# Patient Record
Sex: FEMALE | URBAN - METROPOLITAN AREA
[De-identification: names, ages, dates, MRNs, and addresses within clinical notes are randomized per-mention and may not be internally consistent; named-entity substitution may affect disease eponyms.]

---

## 2024-09-18 ENCOUNTER — APPOINTMENT (RX ONLY)
Dept: URBAN - METROPOLITAN AREA CLINIC 110 | Facility: CLINIC | Age: 24
Setting detail: DERMATOLOGY
End: 2024-09-18

## 2024-09-18 VITALS — WEIGHT: 140 LBS | HEIGHT: 66 IN

## 2024-09-18 DIAGNOSIS — L91.0 HYPERTROPHIC SCAR: ICD-10-CM

## 2024-09-18 PROCEDURE — ? COUNSELING

## 2024-09-18 PROCEDURE — 99203 OFFICE O/P NEW LOW 30 MIN: CPT

## 2024-09-18 PROCEDURE — ? DEFER

## 2024-09-18 PROCEDURE — ? PRESCRIPTION

## 2024-09-18 RX ORDER — IMIQUIMOD 12.5 MG/.25G
CREAM TOPICAL
Qty: 12 | Refills: 0 | Status: ERX | COMMUNITY
Start: 2024-09-18

## 2024-09-18 RX ADMIN — IMIQUIMOD: 12.5 CREAM TOPICAL at 00:00

## 2024-09-18 ASSESSMENT — LOCATION DETAILED DESCRIPTION DERM
LOCATION DETAILED: LEFT SUPERIOR POSTERIOR HELIX
LOCATION DETAILED: RIGHT POSTERIOR EARLOBE
LOCATION DETAILED: LEFT SCAPHA

## 2024-09-18 ASSESSMENT — LOCATION SIMPLE DESCRIPTION DERM
LOCATION SIMPLE: RIGHT EAR
LOCATION SIMPLE: LEFT EAR

## 2024-09-18 ASSESSMENT — LOCATION ZONE DERM: LOCATION ZONE: EAR

## 2024-09-18 NOTE — PROCEDURE: DEFER
Size Of Lesion In Cm (Optional): 0
Detail Level: Detailed
Introduction Text (Please End With A Colon): The following procedure was deferred:
Instructions (Optional): Will send a referral for Dr. Aguilar

## 2024-09-18 NOTE — HPI: SCAR (KELOIDS)
Is This A New Presentation, Or A Follow-Up?: Scars
Additional History: Pt states that she started rogaine develop keloid after she pierced her ears

## 2024-10-01 ENCOUNTER — APPOINTMENT (RX ONLY)
Dept: URBAN - METROPOLITAN AREA CLINIC 111 | Facility: CLINIC | Age: 24
Setting detail: DERMATOLOGY
End: 2024-10-01

## 2024-10-01 DIAGNOSIS — L91.0 HYPERTROPHIC SCAR: ICD-10-CM

## 2024-10-01 PROBLEM — D48.5 NEOPLASM OF UNCERTAIN BEHAVIOR OF SKIN: Status: ACTIVE | Noted: 2024-10-01

## 2024-10-01 PROCEDURE — ? CPT CODE GENERATOR

## 2024-10-01 PROCEDURE — 11900 INJECT SKIN LESIONS </W 7: CPT | Mod: 59

## 2024-10-01 PROCEDURE — ? DEFER

## 2024-10-01 PROCEDURE — 11311 SHAVE SKIN LESION 0.6-1.0 CM: CPT

## 2024-10-01 PROCEDURE — 11313 SHAVE SKIN LESION >2.0 CM: CPT

## 2024-10-01 PROCEDURE — ? INTRALESIONAL KENALOG

## 2024-10-01 PROCEDURE — ? SHAVE REMOVAL

## 2024-10-01 PROCEDURE — ? COUNSELING

## 2024-10-01 ASSESSMENT — LOCATION DETAILED DESCRIPTION DERM
LOCATION DETAILED: LEFT SUPERIOR POSTERIOR HELIX
LOCATION DETAILED: LEFT POSTERIOR EAR
LOCATION DETAILED: LEFT SUPERIOR CRUS OF ANTIHELIX
LOCATION DETAILED: RIGHT POSTERIOR EARLOBE

## 2024-10-01 ASSESSMENT — LOCATION SIMPLE DESCRIPTION DERM
LOCATION SIMPLE: LEFT EAR
LOCATION SIMPLE: RIGHT EAR

## 2024-10-01 ASSESSMENT — LOCATION ZONE DERM: LOCATION ZONE: EAR

## 2024-10-01 NOTE — PROCEDURE: SHAVE REMOVAL
Medical Necessity Information: It is in your best interest to select a reason for this procedure from the list below. All of these items fulfill various CMS LCD requirements except the new and changing color options.
Medical Necessity Clause: This procedure was medically necessary because the lesion that was treated was:
Lab: [id number]
Lab Facility: [id number]
Detail Level: Detailed
Was A Bandage Applied: Yes
Size Of Lesion In Cm (Required): 1
X Size Of Lesion In Cm (Optional): 0
Depth Of Shave: dermis
Biopsy Method: Dermablade
Anesthesia Type: 1% lidocaine with epinephrine
Hemostasis: Drysol
Wound Care: Petrolatum
Render Path Notes In Note?: No
Consent was obtained from the patient. The risks and benefits to therapy were discussed in detail. Specifically, the risks of infection, scarring, bleeding, prolonged wound healing, incomplete removal and recurrence were addressed. Prior to the procedure, the treatment site was clearly identified and confirmed by the patient. All components of Universal Protocol/PAUSE Rule completed.
Post-Care Instructions: I reviewed with the patient in detail post-care instructions. Patient is to keep the biopsy site dry overnight, and then apply bacitracin twice daily until healed. 
Notification Instructions: Patient will be notified of pathology results. However, patient instructed to call the office if not contacted within 2 weeks.
Anticipated Plan (Based On Presumed Biopsy Results): Additionally; 5-0 Fast gut was used to suture some of the remaining skin in place. 
Billing Type: Third-Party Bill
Size Of Lesion In Cm (Required): 2.5
Consent was obtained from the patient. The risks and benefits to therapy were discussed in detail. Specifically, the risks of infection, scarring, bleeding, prolonged wound healing, incomplete removal, allergy to anesthesia, nerve injury and recurrence were addressed. Prior to the procedure, the treatment site was clearly identified and confirmed by the patient. All components of Universal Protocol/PAUSE Rule completed.
Post-Care Instructions: I reviewed with the patient in detail post-care instructions. Patient is to keep the biopsy site dry overnight, and then apply bacitracin twice daily until healed. Patient may apply hydrogen peroxide soaks to remove any crusting.

## 2024-10-01 NOTE — PROCEDURE: COUNSELING
Detail Level: Detailed
Patient Specific Counseling (Will Not Stick From Patient To Patient): I discussed with patient in detail that if I am to remove a keloid, ILK must be injected at the time of excision and monthly thereafter for 6 months. Patient reports understanding of this and agrees to come monthly for the next 6 months.

## 2024-10-01 NOTE — PROCEDURE: DEFER
Procedure To Be Performed At Next Visit: Shave Removal
Detail Level: Detailed
Introduction Text (Please End With A Colon): The following procedure was deferred:
Size Of Lesion In Cm (Optional): 0
Other Procedure: ILK Injection

## 2024-10-01 NOTE — PROCEDURE: INTRALESIONAL KENALOG
How Many Mls Were Removed From The 10 Mg/Ml (5ml) Vial When Preparing The Injectable Solution?: 0
Consent: The risks of atrophy were reviewed with the patient.
Show Inventory Tab: Hide
Bill For Wasted Drug (Kenalog)?: no
Concentration Of Kenalog Solution Injected (Mg/Ml): 20.0
Kenalog Preparation: Kenalog
Detail Level: Detailed
Total Volume (Ccs): .4
Medical Necessity Clause: This procedure was medically necessary because the lesions that were treated were:
Kenalog Type Of Vial: Multiple Dose
Validate Note Data When Using Inventory: Yes

## 2024-10-01 NOTE — PROCEDURE: CPT CODE GENERATOR
Add Ability To Add Global Discount On Main Page: No
[id number] Price: 0.00
First Biopsy Type: Tangential Biopsy
Anticipated Depth And Size Of Soft Tissue Excision (Required): Subcutaneous, Less than 2 cm
J Code Units: 1
Number Of Lesions Injected: Less than 8 lesions
Fee Schedule Discount For Cash Pay Patients In % Off Of Fee Schedule: 0
Anticipated Depth And Size Of Soft Tissue Excision (Required): Subcutaneous, Less than 1.5 cm
How Many Lesions Are You Destroying?: Less than 15
Which Photosensitizer Was Used?: Levulan
Detail Level: Detailed
Anticipated Depth And Size Of Soft Tissue Excision (Required): Subcutaneous, Less than 3 cm
Include Pathology Charges?: Yes
Location (Required): Ears
Fee Schedule Discount In % Off Of Fee Schedule: Standard Fee Schedule as Entered in Pricing Tab
First Procedure You Would Like A Quote For?: Shave Removal
Diameter Of Lesion You Plan To Remove (Required): 2 cm or greater

## 2024-10-15 ENCOUNTER — APPOINTMENT (RX ONLY)
Dept: URBAN - METROPOLITAN AREA CLINIC 111 | Facility: CLINIC | Age: 24
Setting detail: DERMATOLOGY
End: 2024-10-15

## 2024-10-15 DIAGNOSIS — D485 NEOPLASM OF UNCERTAIN BEHAVIOR OF SKIN: ICD-10-CM

## 2024-10-15 DIAGNOSIS — L91.0 HYPERTROPHIC SCAR: ICD-10-CM

## 2024-10-15 PROBLEM — D48.5 NEOPLASM OF UNCERTAIN BEHAVIOR OF SKIN: Status: ACTIVE | Noted: 2024-10-15

## 2024-10-15 PROCEDURE — ? BIOPSY BY SHAVE METHOD

## 2024-10-15 PROCEDURE — ? COUNSELING

## 2024-10-15 PROCEDURE — ? ADDITIONAL NOTES

## 2024-10-15 PROCEDURE — ? PRESCRIPTION

## 2024-10-15 PROCEDURE — 69100 BIOPSY OF EXTERNAL EAR: CPT

## 2024-10-15 PROCEDURE — ? INTRALESIONAL KENALOG

## 2024-10-15 PROCEDURE — 11900 INJECT SKIN LESIONS </W 7: CPT

## 2024-10-15 RX ORDER — MUPIROCIN 20 MG/G
OINTMENT TOPICAL
Qty: 22 | Refills: 1 | Status: ERX | COMMUNITY
Start: 2024-10-15

## 2024-10-15 RX ADMIN — MUPIROCIN: 20 OINTMENT TOPICAL at 00:00

## 2024-10-15 ASSESSMENT — LOCATION DETAILED DESCRIPTION DERM
LOCATION DETAILED: LEFT SUPERIOR HELIX
LOCATION DETAILED: LEFT ANTIHELIX
LOCATION DETAILED: LEFT SUPERIOR POSTERIOR HELIX
LOCATION DETAILED: RIGHT POSTERIOR EARLOBE

## 2024-10-15 ASSESSMENT — LOCATION SIMPLE DESCRIPTION DERM
LOCATION SIMPLE: RIGHT EAR
LOCATION SIMPLE: LEFT EAR

## 2024-10-15 ASSESSMENT — LOCATION ZONE DERM: LOCATION ZONE: EAR

## 2024-10-15 NOTE — PROCEDURE: INTRALESIONAL KENALOG
How Many Mls Were Removed From The 40 Mg/Ml (10ml) Vial When Preparing The Injectable Solution?: 0
Require Ndc Code?: No
Detail Level: Detailed
Total Volume (Ccs): 0.1
Kenalog Type Of Vial: Multiple Dose
Show Inventory Tab: Hide
Medical Necessity Clause: This procedure was medically necessary because the lesions that were treated were:
Validate Note Data When Using Inventory: Yes
Concentration Of Kenalog Solution Injected (Mg/Ml): 40.0
Consent: The risks of atrophy were reviewed with the patient.
Kenalog Preparation: Kenalog
Concentration Of Kenalog Solution Injected (Mg/Ml): 10.0
Total Volume (Ccs): 0.2

## 2024-10-15 NOTE — PROCEDURE: ADDITIONAL NOTES
Render Risk Assessment In Note?: no
Detail Level: Simple
Additional Notes: Thankfully the keloid on patient's antihelix did not involve the helical rim. The area should heal well by secondary intention with consistent application of mupirocin.

## 2024-10-15 NOTE — PROCEDURE: BIOPSY BY SHAVE METHOD
Detail Level: Detailed
Depth Of Biopsy: dermis
Was A Bandage Applied: Yes
Size Of Lesion In Cm: 2.5
X Size Of Lesion In Cm: 1.5
Biopsy Type: H and E
Biopsy Method: 15 blade
Anesthesia Type: 1% lidocaine with epinephrine
Anesthesia Volume In Cc: 0.5
Additional Anesthesia Volume In Cc (Will Not Render If 0): 0
Hemostasis: Aluminum Chloride and Electrocautery
Wound Care: Petrolatum
Dressing: pressure dressing with telfa
Destruction After The Procedure: No
Type Of Destruction Used: Curettage
Curettage Text: The wound bed was treated with curettage after the biopsy was performed.
Cryotherapy Text: The wound bed was treated with cryotherapy after the biopsy was performed.
Electrodesiccation Text: The wound bed was treated with electrodesiccation after the biopsy was performed.
Electrodesiccation And Curettage Text: The wound bed was treated with electrodesiccation and curettage after the biopsy was performed.
Silver Nitrate Text: The wound bed was treated with silver nitrate after the biopsy was performed.
Lab: [id number]
Lab Facility: [id number]
Consent: Written consent was obtained and risks were reviewed including but not limited to scarring, infection, bleeding, scabbing, incomplete removal, nerve damage and allergy to anesthesia.
Post-Care Instructions: I reviewed with the patient in detail post-care instructions. Patient is to keep the biopsy site dry overnight, and then apply bacitracin twice daily until healed. Patient may apply hydrogen peroxide soaks to remove any crusting.
Notification Instructions: Patient will be notified of biopsy results. However, patient instructed to call the office if not contacted within 2 weeks.
Billing Type: Third-Party Bill
Information: Selecting Yes will display possible errors in your note based on the variables you have selected. This validation is only offered as a suggestion for you. PLEASE NOTE THAT THE VALIDATION TEXT WILL BE REMOVED WHEN YOU FINALIZE YOUR NOTE. IF YOU WANT TO FAX A PRELIMINARY NOTE YOU WILL NEED TO TOGGLE THIS TO 'NO' IF YOU DO NOT WANT IT IN YOUR FAXED NOTE.

## 2024-10-29 ENCOUNTER — APPOINTMENT (RX ONLY)
Dept: URBAN - METROPOLITAN AREA CLINIC 111 | Facility: CLINIC | Age: 24
Setting detail: DERMATOLOGY
End: 2024-10-29

## 2024-10-29 VITALS — WEIGHT: 140 LBS | HEIGHT: 66 IN

## 2024-10-29 DIAGNOSIS — L91.0 HYPERTROPHIC SCAR: ICD-10-CM

## 2024-10-29 PROCEDURE — ? COUNSELING

## 2024-10-29 PROCEDURE — ? INTRALESIONAL KENALOG

## 2024-10-29 PROCEDURE — 11900 INJECT SKIN LESIONS </W 7: CPT

## 2024-10-29 ASSESSMENT — LOCATION DETAILED DESCRIPTION DERM
LOCATION DETAILED: LEFT SUPERIOR POSTERIOR HELIX
LOCATION DETAILED: LEFT SUPERIOR HELIX
LOCATION DETAILED: RIGHT INFERIOR POSTERIOR HELIX

## 2024-10-29 ASSESSMENT — LOCATION SIMPLE DESCRIPTION DERM
LOCATION SIMPLE: RIGHT EAR
LOCATION SIMPLE: LEFT EAR

## 2024-10-29 ASSESSMENT — LOCATION ZONE DERM: LOCATION ZONE: EAR

## 2024-10-29 NOTE — PROCEDURE: INTRALESIONAL KENALOG
Kenalog Preparation: Kenalog
How Many Mls Were Removed From The 40 Mg/Ml (5ml) Vial When Preparing The Injectable Solution?: 0
Consent: The risks of atrophy were reviewed with the patient.
Bill For Wasted Drug (Kenalog)?: no
Concentration Of Kenalog Solution Injected (Mg/Ml): 20.0
Medical Necessity Clause: This procedure was medically necessary because the lesions that were treated were:
Show Inventory Tab: Hide
Validate Note Data When Using Inventory: Yes
Administered By (Optional): dr Harrell
Kenalog Type Of Vial: Multiple Dose
Total Volume (Ccs): 0.4
Detail Level: Detailed

## 2024-11-26 ENCOUNTER — APPOINTMENT (RX ONLY)
Dept: URBAN - METROPOLITAN AREA CLINIC 111 | Facility: CLINIC | Age: 24
Setting detail: DERMATOLOGY
End: 2024-11-26

## 2024-11-26 VITALS — HEIGHT: 66 IN | WEIGHT: 140 LBS

## 2024-11-26 DIAGNOSIS — L91.0 HYPERTROPHIC SCAR: ICD-10-CM

## 2024-11-26 PROCEDURE — ? COUNSELING

## 2024-11-26 PROCEDURE — ? INTRALESIONAL KENALOG

## 2024-11-26 PROCEDURE — 11900 INJECT SKIN LESIONS </W 7: CPT

## 2024-11-26 ASSESSMENT — LOCATION SIMPLE DESCRIPTION DERM
LOCATION SIMPLE: RIGHT EAR
LOCATION SIMPLE: LEFT EAR

## 2024-11-26 ASSESSMENT — LOCATION ZONE DERM: LOCATION ZONE: EAR

## 2024-12-30 ENCOUNTER — APPOINTMENT (OUTPATIENT)
Dept: URBAN - METROPOLITAN AREA CLINIC 111 | Facility: CLINIC | Age: 24
Setting detail: DERMATOLOGY
End: 2024-12-30

## 2024-12-30 VITALS — WEIGHT: 140 LBS | HEIGHT: 66 IN

## 2024-12-30 DIAGNOSIS — L91.0 HYPERTROPHIC SCAR: ICD-10-CM

## 2024-12-30 PROCEDURE — ? COUNSELING

## 2024-12-30 PROCEDURE — 11900 INJECT SKIN LESIONS </W 7: CPT

## 2024-12-30 PROCEDURE — ? INTRALESIONAL KENALOG

## 2024-12-30 ASSESSMENT — LOCATION SIMPLE DESCRIPTION DERM
LOCATION SIMPLE: RIGHT EAR
LOCATION SIMPLE: LEFT EAR

## 2024-12-30 ASSESSMENT — LOCATION DETAILED DESCRIPTION DERM
LOCATION DETAILED: LEFT SUPERIOR HELIX
LOCATION DETAILED: LEFT SUPERIOR POSTERIOR HELIX
LOCATION DETAILED: RIGHT INFERIOR POSTERIOR HELIX

## 2024-12-30 ASSESSMENT — LOCATION ZONE DERM: LOCATION ZONE: EAR

## 2024-12-30 NOTE — PROCEDURE: INTRALESIONAL KENALOG
Kenalog Preparation: Kenalog
How Many Mls Were Removed From The 40 Mg/Ml (5ml) Vial When Preparing The Injectable Solution?: 0
Consent: The risks of atrophy were reviewed with the patient.
Bill For Wasted Drug (Kenalog)?: no
Concentration Of Kenalog Solution Injected (Mg/Ml): 20.0
Medical Necessity Clause: This procedure was medically necessary because the lesions that were treated were:
Show Inventory Tab: Hide
Validate Note Data When Using Inventory: Yes
Administered By (Optional): Dr. Harrell
Kenalog Type Of Vial: Multiple Dose
Total Volume (Ccs): 0.2
Detail Level: Detailed